# Patient Record
Sex: FEMALE
[De-identification: names, ages, dates, MRNs, and addresses within clinical notes are randomized per-mention and may not be internally consistent; named-entity substitution may affect disease eponyms.]

---

## 2021-10-15 ENCOUNTER — APPOINTMENT (OUTPATIENT)
Dept: UROLOGY | Facility: CLINIC | Age: 66
End: 2021-10-15
Payer: MEDICARE

## 2021-10-15 DIAGNOSIS — Z87.891 PERSONAL HISTORY OF NICOTINE DEPENDENCE: ICD-10-CM

## 2021-10-15 DIAGNOSIS — Z87.11 PERSONAL HISTORY OF PEPTIC ULCER DISEASE: ICD-10-CM

## 2021-10-15 DIAGNOSIS — Z78.9 OTHER SPECIFIED HEALTH STATUS: ICD-10-CM

## 2021-10-15 PROBLEM — Z00.00 ENCOUNTER FOR PREVENTIVE HEALTH EXAMINATION: Status: ACTIVE | Noted: 2021-10-15

## 2021-10-15 PROCEDURE — 99204 OFFICE O/P NEW MOD 45 MIN: CPT

## 2021-10-15 RX ORDER — OMEPRAZOLE 40 MG/1
CAPSULE, DELAYED RELEASE ORAL
Refills: 0 | Status: ACTIVE | COMMUNITY

## 2021-10-15 RX ORDER — PAROXETINE HYDROCHLORIDE 40 MG/1
TABLET, FILM COATED ORAL
Refills: 0 | Status: ACTIVE | COMMUNITY

## 2021-10-15 RX ORDER — SULFAMETHOXAZOLE AND TRIMETHOPRIM 400; 80 MG/1; MG/1
TABLET ORAL
Refills: 0 | Status: ACTIVE | COMMUNITY

## 2021-10-15 RX ORDER — SULFAMETHOXAZOLE AND TRIMETHOPRIM 800; 160 MG/1; MG/1
800-160 TABLET ORAL
Refills: 0 | Status: ACTIVE | COMMUNITY

## 2021-10-15 NOTE — HISTORY OF PRESENT ILLNESS
[Currently Experiencing ___] :  [unfilled] [Urinary Urgency] : urinary urgency [Urinary Frequency] : urinary frequency [Dysuria] : dysuria [Hematuria - Microscopic] : microscopic hematuria [4] : 4 [Lower Back] : lower back [FreeTextEntry1] : Ms. Ornelas is a very pleasant 66 year old woman who is here today for recurrent UTIs.\par She reports that she has a UTI every month which she was dealing with for 2 years. She has been on antibiotics constantly for the past two years.\par She reports constant dysuria, microscopic hematuria, increased urgency and frequency.\par She voids small amounts and voids every 2-3 hours.\par She reports a feeling of fullness in her abdomen that she relates to an ulcer and right ovarian cyst.\par Reports that when she wipes she puts soap on her vaginal and rectal area and wipes back to front several times.\par Drinks 4- 16 ounce bottles of water.\par Denies personal and familial history of kidney stones and urological cancers. Yes

## 2021-10-15 NOTE — PHYSICAL EXAM

## 2021-10-15 NOTE — REVIEW OF SYSTEMS
[see HPI] : see HPI [Urine Infection (bladder/kidney)] : bladder/kidney infection [Negative] : Heme/Lymph

## 2021-10-15 NOTE — ASSESSMENT
[FreeTextEntry1] : Ms. Ornelas is a very pleasant 66 year old woman who is here today for recurrent UTIs, microscopic hematuria\par She reports that she has a UTI every month which she was dealing with for 2 years. She has been on antibiotics constantly for the past two years.\par PVR \par Education on proper hygiene in regards to wiping post voiding and bowel movements. Discussed to wipe from front to back, with baby wipes if she so desires.\par Start cranberry supplement\par Obtain renal US from previous PCP\par Follow up cystoscopy to rule out any other etiologies.\par Urinalysis at this time\par Urine culture\par Renal ultrasound\par

## 2021-10-19 ENCOUNTER — APPOINTMENT (OUTPATIENT)
Dept: UROLOGY | Facility: CLINIC | Age: 66
End: 2021-10-19

## 2021-10-20 LAB
APPEARANCE: CLEAR
BACTERIA UR CULT: ABNORMAL
BACTERIA: ABNORMAL
BILIRUBIN URINE: NEGATIVE
BLOOD URINE: NEGATIVE
COLOR: YELLOW
GLUCOSE QUALITATIVE U: NEGATIVE
HYALINE CASTS: 0 /LPF
KETONES URINE: NEGATIVE
LEUKOCYTE ESTERASE URINE: NEGATIVE
MICROSCOPIC-UA: NORMAL
NITRITE URINE: NEGATIVE
PH URINE: 7
PROTEIN URINE: NORMAL
RED BLOOD CELLS URINE: 2 /HPF
SPECIFIC GRAVITY URINE: 1.01
SQUAMOUS EPITHELIAL CELLS: 1 /HPF
URINE COMMENTS: NORMAL
UROBILINOGEN URINE: NORMAL
WHITE BLOOD CELLS URINE: 1 /HPF

## 2021-10-22 ENCOUNTER — APPOINTMENT (OUTPATIENT)
Dept: UROLOGY | Facility: CLINIC | Age: 66
End: 2021-10-22
Payer: MEDICARE

## 2021-10-22 PROCEDURE — 99214 OFFICE O/P EST MOD 30 MIN: CPT

## 2021-10-22 RX ORDER — ATORVASTATIN CALCIUM 10 MG/1
10 TABLET, FILM COATED ORAL
Qty: 90 | Refills: 0 | Status: ACTIVE | COMMUNITY
Start: 2021-10-15

## 2021-10-22 RX ORDER — CIPROFLOXACIN HYDROCHLORIDE 500 MG/1
500 TABLET, FILM COATED ORAL TWICE DAILY
Qty: 10 | Refills: 0 | Status: ACTIVE | COMMUNITY
Start: 2021-10-20 | End: 1900-01-01

## 2021-10-22 NOTE — HISTORY OF PRESENT ILLNESS
[FreeTextEntry1] : 66-year-old woman who presents for follow-up of recurrent urinary tract infections.  She reports continued dysuria.  She recently had a urine culture performed which demonstrated Enterococcus faecalis urinary tract infection sensitive to Cipro.  She reports that she has been prescribed Bactrim many times in the past.  She continues to wipe from back to front and use very hot water to clean herself after she urinates.  She has not started taking Cipro yet because she requested that it be sent to a mail order pharmacy and the medication has not yet arrived.

## 2021-10-22 NOTE — ASSESSMENT
[FreeTextEntry1] : 66-year-old woman who presents for follow-up of recurrent urinary tract infections\par -Urine culture reviewed demonstrating an Enterococcus faecalis urinary tract infection sensitive to Cipro\par -Refill for Cipro sent to the pharmacy so that she may pick it up and start using the medication today\par -Cystoscopy next week; we discussed the possibility of becoming ill if cystoscopy was performed today and the rationale to not do so\par -We had a prolonged discussion regarding proper voiding habits as well as ways of preventing urinary tract infections in the future\par -Start cranberry supplement as she has not yet done so

## 2021-10-26 ENCOUNTER — APPOINTMENT (OUTPATIENT)
Dept: OBGYN | Facility: CLINIC | Age: 66
End: 2021-10-26

## 2021-10-26 ENCOUNTER — APPOINTMENT (OUTPATIENT)
Dept: UROLOGY | Facility: CLINIC | Age: 66
End: 2021-10-26

## 2021-10-29 ENCOUNTER — APPOINTMENT (OUTPATIENT)
Dept: UROLOGY | Facility: CLINIC | Age: 66
End: 2021-10-29
Payer: MEDICARE

## 2021-10-29 DIAGNOSIS — R30.0 DYSURIA: ICD-10-CM

## 2021-10-29 DIAGNOSIS — R31.29 OTHER MICROSCOPIC HEMATURIA: ICD-10-CM

## 2021-10-29 DIAGNOSIS — R39.15 URGENCY OF URINATION: ICD-10-CM

## 2021-10-29 DIAGNOSIS — N39.0 URINARY TRACT INFECTION, SITE NOT SPECIFIED: ICD-10-CM

## 2021-10-29 PROCEDURE — 52000 CYSTOURETHROSCOPY: CPT

## 2021-10-29 PROCEDURE — 99214 OFFICE O/P EST MOD 30 MIN: CPT | Mod: 25

## 2021-10-29 RX ORDER — MIRABEGRON 50 MG/1
50 TABLET, FILM COATED, EXTENDED RELEASE ORAL
Qty: 30 | Refills: 1 | Status: ACTIVE | COMMUNITY
Start: 2021-10-29 | End: 1900-01-01

## 2021-10-30 PROBLEM — N39.0 RECURRENT URINARY TRACT INFECTION: Status: ACTIVE | Noted: 2021-10-15

## 2021-10-30 PROBLEM — R31.29 MICROSCOPIC HEMATURIA: Status: ACTIVE | Noted: 2021-10-15

## 2021-10-30 NOTE — HISTORY OF PRESENT ILLNESS
[FreeTextEntry1] : 66-year-old woman who presents for follow-up of dysuria, recent urinary tract infection, concern for recurrent urinary tract infections, microscopic hematuria.  She underwent a cystoscopy today which demonstrated no urothelial lesions.  She was noted to complain of significant dysuria with minimal bladder filling.  She reports that she frequently goes to the bathroom and reports urinary urgency.  She has not started taking cranberry pills yet as previously advised.

## 2021-10-30 NOTE — ASSESSMENT
[FreeTextEntry1] : 66-year-old woman who presents for follow-up of urinary urgency, concern for recurrent urinary tract infections, dysuria\par -Urinalysis reviewed demonstrating 2 red blood cells per high-power field\par -Urine culture reviewed demonstrating a urinary tract infection sensitive to Cipro which she reports she completed\par -Urine culture today \par -cystoscopy today demonstrates no urothelial lesions\par -Patient was noted to complain of significant dysuria upon minimal filling of the bladder\par -I recommended a trial of Ditropan.  We discussed the risks and benefits of the treatment.  After thorough discussion of the risks and benefits patient does not wish to try Ditropan because of concern for side effects with the medication\par -Trial of mirabegron.  We discussed the risks and benefits of mirabegron at length.  We discussed the importance of monitoring blood pressure while on this medication.  Patient is comfortable with the potential side effects of the medication would like to try the medication\par -Start cranberry pills.  Written instructions were given to the patient regarding how to take cranberry supplements.  We discussed the indications to start a cranberry supplement and the importance of doing some\par -Follow-up in 1 month

## 2021-11-30 ENCOUNTER — APPOINTMENT (OUTPATIENT)
Dept: UROLOGY | Facility: CLINIC | Age: 66
End: 2021-11-30

## 2024-06-05 ENCOUNTER — APPOINTMENT (OUTPATIENT)
Dept: UROLOGY | Facility: CLINIC | Age: 69
End: 2024-06-05